# Patient Record
Sex: FEMALE | Race: WHITE
[De-identification: names, ages, dates, MRNs, and addresses within clinical notes are randomized per-mention and may not be internally consistent; named-entity substitution may affect disease eponyms.]

---

## 2018-03-07 ENCOUNTER — HOSPITAL ENCOUNTER (OUTPATIENT)
Dept: HOSPITAL 80 - BMCIMAGING | Age: 58
End: 2018-03-07
Attending: INTERNAL MEDICINE
Payer: COMMERCIAL

## 2018-03-07 DIAGNOSIS — E04.2: ICD-10-CM

## 2018-03-07 DIAGNOSIS — Z12.31: Primary | ICD-10-CM

## 2019-02-01 ENCOUNTER — HOSPITAL ENCOUNTER (OUTPATIENT)
Dept: HOSPITAL 80 - FIMAGING | Age: 59
End: 2019-02-01
Attending: ORTHOPAEDIC SURGERY
Payer: COMMERCIAL

## 2019-02-01 DIAGNOSIS — M16.11: Primary | ICD-10-CM

## 2019-02-20 ENCOUNTER — HOSPITAL ENCOUNTER (INPATIENT)
Dept: HOSPITAL 80 - F3N | Age: 59
LOS: 1 days | Discharge: HOME | DRG: 470 | End: 2019-02-21
Attending: ORTHOPAEDIC SURGERY | Admitting: ORTHOPAEDIC SURGERY
Payer: COMMERCIAL

## 2019-02-20 DIAGNOSIS — M16.11: Primary | ICD-10-CM

## 2019-02-20 DIAGNOSIS — Z23: ICD-10-CM

## 2019-02-20 PROCEDURE — G0008 ADMIN INFLUENZA VIRUS VAC: HCPCS

## 2019-02-20 PROCEDURE — C1713 ANCHOR/SCREW BN/BN,TIS/BN: HCPCS

## 2019-02-20 RX ADMIN — HYDROMORPHONE HYDROCHLORIDE PRN MG: 2 INJECTION INTRAMUSCULAR; INTRAVENOUS; SUBCUTANEOUS at 14:32

## 2019-02-20 RX ADMIN — HYDROMORPHONE HYDROCHLORIDE PRN MG: 2 INJECTION INTRAMUSCULAR; INTRAVENOUS; SUBCUTANEOUS at 15:07

## 2019-02-20 RX ADMIN — SODIUM CHLORIDE, SODIUM LACTATE, POTASSIUM CHLORIDE, AND CALCIUM CHLORIDE SCH MLS: 600; 310; 30; 20 INJECTION, SOLUTION INTRAVENOUS at 17:39

## 2019-02-20 RX ADMIN — Medication PRN MCG: at 14:27

## 2019-02-20 RX ADMIN — HYDROMORPHONE HYDROCHLORIDE PRN MG: 2 INJECTION INTRAMUSCULAR; INTRAVENOUS; SUBCUTANEOUS at 14:37

## 2019-02-20 RX ADMIN — FAMOTIDINE SCH MG: 20 TABLET, FILM COATED ORAL at 20:32

## 2019-02-20 RX ADMIN — ACETAMINOPHEN SCH MG: 325 TABLET ORAL at 17:39

## 2019-02-20 RX ADMIN — ONDANSETRON PRN MG: 2 SOLUTION INTRAMUSCULAR; INTRAVENOUS at 20:43

## 2019-02-20 RX ADMIN — HYDROMORPHONE HYDROCHLORIDE PRN MG: 2 INJECTION INTRAMUSCULAR; INTRAVENOUS; SUBCUTANEOUS at 14:50

## 2019-02-20 RX ADMIN — ASPIRIN 81 MG SCH MG: 81 TABLET ORAL at 20:31

## 2019-02-20 RX ADMIN — DOCUSATE SODIUM AND SENNOSIDES SCH TAB: 50; 8.6 TABLET ORAL at 20:31

## 2019-02-20 RX ADMIN — Medication SCH MLS: at 20:30

## 2019-02-20 RX ADMIN — Medication PRN MCG: at 14:34

## 2019-02-20 RX ADMIN — OXYCODONE HYDROCHLORIDE PRN MG: 15 TABLET ORAL at 18:08

## 2019-02-20 RX ADMIN — OXYCODONE HYDROCHLORIDE PRN MG: 15 TABLET ORAL at 16:52

## 2019-02-20 NOTE — PDANEPAE
ANE History of Present Illness


R hip pain, here for RTHA





ANE Past Medical History





- Cardiovascular History


Hx Hypertension: No


Hx Arrhythmias: No


Hx Chest Pain: No


Hx Coronary Artery / Peripheral Vascular Disease: No


Hx CHF / Valvular Disease: No


Hx Palpitations: No





- Pulmonary History


Hx COPD: No


Hx Asthma/Reactive Airway Disease: No


Hx Recent Upper Respiratory Infection: No


Hx Oxygen in Use at Home: No


Hx Sleep Apnea: No


Sleep Apnea Screening Result - Last Documented: Negative





- Neurologic History


Hx Cerebrovascular Accident: No


Hx Seizures: No


Hx Dementia: No





- Endocrine History


Hx Diabetes: No





- Renal History


Hx Renal Disorders: No





- Liver History


Hx Hepatic Disorders: No





- Neurological & Psychiatric Hx


Hx Neurological and Psychiatric Disorders: No





- Cancer History


Hx Cancer: Yes


Cancer History Comment: pre-cancerous lumps to breast





- Congenital Disorder History


Hx Congenital Disorders: No





- GI History


Hx Gastrointestinal Disorders: No





- Other Health History


Other Health History: none





- Chronic Pain History


Chronic Pain: Yes (R neck/shoulder)





- Surgical History


Prior Surgeries: none in last 5 yrs.  2009 total hysterectomy





ANE Review of Systems


Review of Systems: 








- Exercise capacity


METS (RN): 4 METS





ANE Patient History





- Allergies


Allergies/Adverse Reactions: 








No Known Allergies Allergy (Verified 02/20/19 10:22)


 








- Home Medications


Home Medications: 








Cholecalciferol Vit D3 [Vitamin D3 (*)] 1,000 units PO DAILY 02/01/19 [Last 

Taken 02/06/19]


Estradiol [Estraderm 0.1 MG (RX)] 0.1 mg TD Q14D 02/01/19 [Last Taken 02/13/19]


Multivitamins [Multivitamin (*)] 1 each PO DAILY 02/01/19 [Last Taken 02/06/19]


Progesterone, Micronized [Progesterone] 100 mg PO HS 02/01/19 [Last Taken 02/13/ 19]








- NPO status


NPO Since - Liquids (Date): 02/20/19


NPO Since - Liquids (Time): 09:00


NPO Since - Solids (Date): 02/19/19


NPO Since - Solids (Time): 18:30





- Smoking Hx


Smoking Status: Never smoked





- Family Anes Hx


Family Hx Anesthesia Complications: mother,brother severe nausea





ANE Labs/Vital Signs





- Vital Signs


Blood Pressure: 120/52


Heart Rate: 61


Respiratory Rate: 16


O2 Sat (%): 99


Height: 170.18 cm


Weight: 57.606 kg





ANE Physical Exam





- Airway


Neck exam: FROM


Mallampati Score: Class 1


Mouth exam: normal dental/mouth exam





- Pulmonary


Pulmonary: no respiratory distress, no rales or rhonchi





- Cardiovascular


Cardiovascular: regular rate and rhythym, no murmur, rub, or gallop





- ASA Status


ASA Status: II





ANE Anesthesia Plan


Anesthesia Plan: GA with mask, spinal


Total IV Anesthesia: Yes

## 2019-02-20 NOTE — PDMN
Medical Necessity


[place] [facility] [place] A-2 [place]:  57 yo s/p R SREE, [facility] IP 

only

## 2019-02-20 NOTE — POSTOPPROG
Post Op Note


Date of Operation: 02/20/19


Surgeon: RANDY Randall


Assistant: Fabi Grimes and Maida Randall, PAC


Anesthesiologist: Dr. Florez


Anesthesia: Spinal


Pre-op Diagnosis: right hip OA


Post-op Diagnosis: same


Indication: right hip pain


Procedure: right SREE, robot assisted


Findings: severe OA of right hip


Inf/Abcess present in the surg proc area at time of surgery?: No


EBL:

## 2019-02-20 NOTE — POSTANESTH
Post Anesthetic Evaluation


Cardiovascular Status: Normal, Stable


Respiratory Status: Normal, Stable


Level of Consciousness/Mental Status: Can Participate in Eval, Alert and 

Oriented


Pain Control: Adequate, Prn Tx Ordered


Nausea/Vomiting Control: Adequate, Prn Tx Ordered


Complications Possibly Related to Anesthesia: None Noted (moving bilateral 

lower extremities and comfortable for exam)

## 2019-02-21 VITALS — DIASTOLIC BLOOD PRESSURE: 61 MMHG | SYSTOLIC BLOOD PRESSURE: 112 MMHG

## 2019-02-21 RX ADMIN — SODIUM CHLORIDE, SODIUM LACTATE, POTASSIUM CHLORIDE, AND CALCIUM CHLORIDE SCH MLS: 600; 310; 30; 20 INJECTION, SOLUTION INTRAVENOUS at 10:44

## 2019-02-21 RX ADMIN — FAMOTIDINE SCH MG: 20 TABLET, FILM COATED ORAL at 10:14

## 2019-02-21 RX ADMIN — DOCUSATE SODIUM AND SENNOSIDES SCH TAB: 50; 8.6 TABLET ORAL at 10:14

## 2019-02-21 RX ADMIN — ACETAMINOPHEN SCH: 325 TABLET ORAL at 00:10

## 2019-02-21 RX ADMIN — OXYCODONE HYDROCHLORIDE PRN MG: 15 TABLET ORAL at 01:49

## 2019-02-21 RX ADMIN — ONDANSETRON PRN MG: 2 SOLUTION INTRAMUSCULAR; INTRAVENOUS at 01:48

## 2019-02-21 RX ADMIN — ACETAMINOPHEN SCH MG: 325 TABLET ORAL at 01:48

## 2019-02-21 RX ADMIN — ACETAMINOPHEN SCH MG: 325 TABLET ORAL at 12:20

## 2019-02-21 RX ADMIN — Medication SCH MLS: at 05:10

## 2019-02-21 RX ADMIN — SODIUM CHLORIDE, SODIUM LACTATE, POTASSIUM CHLORIDE, AND CALCIUM CHLORIDE SCH MLS: 600; 310; 30; 20 INJECTION, SOLUTION INTRAVENOUS at 01:48

## 2019-02-21 RX ADMIN — ACETAMINOPHEN SCH MG: 325 TABLET ORAL at 05:09

## 2019-02-21 RX ADMIN — ASPIRIN 81 MG SCH MG: 81 TABLET ORAL at 10:14

## 2019-02-21 NOTE — SOAPPROG
SOAP Progress Note


Assessment/Plan: 


Assessment:





Patient is doing well POD 1 s/p R SREE


Pain management: patient received 8mg of IV morphine last night. has not 

received flexeril.  recommend d/c morphine. encouraging flexeril 


VTE ppx: recommend aspirin 81 mg BID for 4 weeks, cont JONNIE and SCDs


Anemia: level is expected initially postop. Asymptomatic. Continue to monitor


vasovagal episode: patient had vasovagal episode yesterday one hour after 

morphine. patient states mild lightheadedness this morning.  


D/c planning: Patient is planning on d/c to home today, recommend monitoring 

for lightheadedness and pain control today.  she may need another night.  

Patient must be released from PT before discharge to home. 




















Plan:





02/21/19 08:44





Subjective: 





patient is doing well today, denies SOB, chest pain and n/v


Objective: 





 Vital Signs











Temp Pulse Resp BP Pulse Ox


 


 36.7 C   59 L  18   112/61   96 


 


 02/21/19 08:32  02/21/19 08:32  02/21/19 08:32  02/21/19 08:32  02/21/19 08:32








 Laboratory Results





 02/21/19 05:34 





 











 02/20/19 02/21/19 02/22/19





 05:59 05:59 05:59


 


Intake Total  4060 


 


Output Total  2300 


 


Balance  1760 








RLE: incision dressing is clean and dry, NVI, +pf/df





ICD10 Worksheet


Patient Problems: 


 Problems











Problem Status Onset


 


Primary localized osteoarthritis of right hip Acute

## 2019-02-25 NOTE — GDS
[f rep st]



                                                             DISCHARGE SUMMARY





ADMISSION DIAGNOSIS:  Right hip osteoarthritis.



DISCHARGE DIAGNOSIS:  Right hip osteoarthritis.



PROCEDURE:  Right total hip arthroplasty, robotic assisted.



VTE PROPHYLAXIS:  Recommend aspirin 81 mg twice daily for 4 weeks.



BRIEF DESCRIPTION OF HOSPITAL STAY:  Patient was admitted for an elective joint arthroplasty. The pat
ient tolerated the procedure well and has passed physical therapy. The patient was given appropriate 
antibiotic prophylaxis and venous thromboembolism prophylaxis. The patient's pain was well controlled
 on oral pain medication, patient was holding down food, and had urinated. Decision was made to disch
arge the patient. The patient was given post-operative prescriptions pre-operatively.



PLAN:  To follow up as scheduled with Dr. Randall's office March 11th at 10:15 a.m.





Job #:  [id number]

## 2019-02-26 NOTE — GOP
[f rep st]



                                                                OPERATIVE REPORT





DATE OF OPERATION:  02/20/2019



SURGEON:  RAE Randall MD



ASSISTANT:  1. BRIANNA Hancokc. 

2. BRIANNA Webster.



ANESTHESIA:  Spinal.



PREOPERATIVE DIAGNOSIS:  Right hip osteoarthritis.



POSTOPERATIVE DIAGNOSIS:  Right hip osteoarthritis.



PROCEDURE PERFORMED:  Right total hip arthroplasty with computer navigation, robotic assist.



FINDINGS:  





ESTIMATED BLOOD LOSS:  200 cc.



INDICATIONS:  The patient has progressively worsening arthritis of the hip which has failed medical m
anagement.  The patient understands the treatment option including continued non-operative care and h
as selected surgical intervention.  The patient has decided to undergo total hip arthroplasty via the
 direct anterior approach understanding the risks of the procedure including, but not limited to, marcelo
rovascular injury, infection, persistent pain, component wear and loosening, deep venous thrombosis, 
pulmonary embolism, limb length inequality (including dislocation), and intraoperative fractures.



DESCRIPTION OF PROCEDURE:  After proper identification of the patient including verification and cesario
ing the surgical site, the patient was brought to the operating room and placed in the supine positio
n.  All bony prominences were well padded.  Anesthesia was induced without complication and intraveno
us prophylactic antibiotics were administered prior to skin incision. 



After prepping and draping in the usual sterile fashion, attention was drawn to the contralateral pel
vis for attachment of the computer navigation tracker.  Three percutaneous incisions were made over t
he iliac crest and the pelvic tracker was affixed using threaded 3.5 mm pins yielding excellent fixat
ion.  Using computer navigation the patient's leg length and topographical pelvic anatomy was registe
red without complication. 



Attention was then drawn to surgical exposure of the hip.  An incision was made with a #10 Bard Christina
r blade starting 3 cm lateral and 3 cm distal to the anterior superior iliac spine measuring 8 cm to 
10 cm and coursing distally toward the greater trochanter.  The skin and subcutaneous tissues were di
vided sharply down the fascia raffi.  The fascia raffi was incised in line with the skin incision expos
ing the underlying tensor fascia raffi muscle.  This muscle was bluntly elevated from the fascia and t
he first extracapsular Cobra retractor was placed laterally at the junction of the superior femoral n
johnathan and greater trochanter.  The lateral femoral circumflex vessels were identified, cauterized and d
ivided with the Aquamantys bipolar cautery.  The deep investing fascia of the TFL was divided to allo
w proper mobilization of the muscle preventing damage during retraction.  The reflected head of the r
ectus femoris muscle was elevated off the anterior hip capsule and a medial Cobra retractor was place
d just proximal to the lesser trochanter. 



The anterior capsulotomy was made sharply from the superolateral acetabulum to the saddle junction of
 the superior femoral neck and greater trochanter, then coursing inferomedial towards the lesser troc
hanter.  The retractors were then placed in the intracapsular position for femoral neck osteotomy.  C
orresponding to preoperative templating the osteotomy was made with the oscillating saw protecting th
e greater trochanter and soft tissues.  The femoral head was removed from the acetabulum with a corks
crew and confirmed to be severely arthritic with exposed bone, deformity and osteophytes.  Similar fi
ndings were confirmed in the acetabulum. 



The Arch table extension was then placed in 40 degrees external rotation.  Attention was then drawn t
o the acetabular preparation.  After placement of the anterior and posterior Cobra retractors outside
 the labrum and intrascapular the circumferential labrum was removed sharply.  The foveal contents we
re then removed and hemostasis obtained with cautery.  The anatomy of the acetabulum was then registe
red using computer navigation. 



The first reamer selected was sized using the removed femoral head.  Reaming began with robotic enma
t at 40 degrees of abduction and 20 degrees of anteversion using computer navigation.  Reaming ceased
 0 mm less than the definitive acetabular component.  The final acetabular component was inserted usi
ng the computer to achieve proper orientation yielding excellent purchase and stability in the acetab
ulum.  The final acetabular liner was then placed and its seating confirmed. 



Attention was then turned to the femur.  The Arch table extension was placed in extension and adducti
on delivering the osteotomized femoral neck into the wound.  A 2-pronged femoral elevator was placed 
at the calcar and another at the tip of the greater trochanter.  The posterolateral capsule was relea
sed with cautery allowing mobilization of the femur lateral and anterior for preparation.  The extern
al rotators were visualized and preserved.  A curette and rongeur were used to open the starting poin
t for broaching. Serial broaching started with the #0 broach and ended with the broach that exhibited
 excellent fit in the proximal femur.  A change in pitch during mallet strikes was accompanied by the
 inability to advance the broach any further.  The trial reduction was performed and fluoroscopic ted
igation was utilized to check limb length.  Adjustments were made to equalize limb length accordingly
. 



After the final trials were accepted they were removed and the wound was copiously lavaged.  The femo
ral component was seated to the same depth as the final broach and the femoral head was impacted onto
 the clean trunnion.  The hip was then reduced for the final time and once more fluoroscopic navigati
on used to check that limb length equality was achieved. 



The wound was irrigated and closed in layers, the fascia raffi with 2-0 Quill, the subcutaneous tissue
 with a 2-0 Quill, and the skin with Dermabond, including the small incisions for computer navigation
.  Sterile dressings were applied.  Final sharps and sponge counts were accurate.  The patient was th
en transferred to a hospital bed and brought to the recovery room in stable condition. 



Prior to placing the acetabular liner, one 6.5 x 30 mm screw was placed.



IMPLANTS:  Accolade II size 4 at 132.  Acetabular component, a Trident II 52 mm.  Liner is a Trident 
X3, 36 mm.  Head is a Biolox Delta 36 mm +2.5.





Job #:  [id number]